# Patient Record
Sex: MALE | Race: WHITE | NOT HISPANIC OR LATINO
[De-identification: names, ages, dates, MRNs, and addresses within clinical notes are randomized per-mention and may not be internally consistent; named-entity substitution may affect disease eponyms.]

---

## 2019-07-03 ENCOUNTER — APPOINTMENT (OUTPATIENT)
Dept: THORACIC SURGERY | Facility: CLINIC | Age: 69
End: 2019-07-03

## 2019-07-03 VITALS
WEIGHT: 204 LBS | HEART RATE: 79 BPM | RESPIRATION RATE: 15 BRPM | TEMPERATURE: 97.8 F | DIASTOLIC BLOOD PRESSURE: 89 MMHG | SYSTOLIC BLOOD PRESSURE: 135 MMHG | OXYGEN SATURATION: 94 %

## 2019-07-03 DIAGNOSIS — F17.200 NICOTINE DEPENDENCE, UNSPECIFIED, UNCOMPLICATED: ICD-10-CM

## 2019-07-03 DIAGNOSIS — Z87.19 PERSONAL HISTORY OF OTHER DISEASES OF THE DIGESTIVE SYSTEM: ICD-10-CM

## 2019-07-03 DIAGNOSIS — R94.2 ABNORMAL RESULTS OF PULMONARY FUNCTION STUDIES: ICD-10-CM

## 2019-07-03 PROBLEM — Z00.00 ENCOUNTER FOR PREVENTIVE HEALTH EXAMINATION: Status: ACTIVE | Noted: 2019-07-03

## 2019-07-03 NOTE — HISTORY OF PRESENT ILLNESS
[FreeTextEntry1] : 68 year old male, current smoker (45 pack years), with no significant medical history. Lung cancer screening  surveillance CT chest revealed left lower lobe nodule. Follow up PET scan revealed left lower lobe spiculated mass with max SUV of 4.7. He presents today for an initial evaluation. He is referred by Dr. Britton. \par \par Today the patient reports feeling generally well. He denies fever, chills, unintentional weight loss and pain. He does admit to SOB on exertion and an occasional cough.  \par \par PET scan completed on 6/25/19:\par - focal abnormal hypermetabolic activity associated with the spiculated mass medial left lower lobe lung ( SUV 4.7)\par - hypermetabolic subcutaneous focus in the right of midline in the lower back new since 11/2017, possible sebaceous cyst

## 2019-07-03 NOTE — REVIEW OF SYSTEMS
[As Noted in HPI] : as noted in HPI [Cough] : cough [SOB on Exertion] : shortness of breath during exertion [Negative] : Heme/Lymph

## 2019-07-03 NOTE — PHYSICAL EXAM
[General Appearance - Alert] : alert [General Appearance - In No Acute Distress] : in no acute distress [General Appearance - Well Nourished] : well nourished [PERRL With Normal Accommodation] : pupils were equal in size, round, and reactive to light [Sclera] : the sclera and conjunctiva were normal [Outer Ear] : the ears and nose were normal in appearance [Both Tympanic Membranes Were Examined] : both tympanic membranes were normal [Neck Appearance] : the appearance of the neck was normal [Neck Cervical Mass (___cm)] : no neck mass was observed [Respiration, Rhythm And Depth] : normal respiratory rhythm and effort [Exaggerated Use Of Accessory Muscles For Inspiration] : no accessory muscle use [Apical Impulse] : the apical impulse was normal [Heart Rate And Rhythm] : heart rate was normal and rhythm regular [Heart Sounds] : normal S1 and S2 [Examination Of The Chest] : the chest was normal in appearance [2+] : left 2+ [Bowel Sounds] : normal bowel sounds [Abnormal Walk] : normal gait [Abdomen Soft] : soft [Musculoskeletal - Swelling] : no joint swelling seen [Nail Clubbing] : no clubbing  or cyanosis of the fingernails [Skin Color & Pigmentation] : normal skin color and pigmentation [Skin Turgor] : normal skin turgor [] : no rash [Oriented To Time, Place, And Person] : oriented to person, place, and time [Affect] : the affect was normal [Impaired Insight] : insight and judgment were intact

## 2019-07-03 NOTE — ASSESSMENT
[FreeTextEntry1] : 68 year old male, current smoker (45 pack years), with no significant medical history. Lung cancer screening  surveillance CT chest revealed left lower lobe nodule. Follow up PET scan revealed left lower lobe spiculated mass with max SUV of 4.7. He presents today for an initial evaluation. \par \par Today the patient reports feeling generally well. He denies fever, chills, unintentional weight loss and pain. He does admit to SOB on exertion and an occasional cough. PET scan completed on 6/25/19 was reviewed which revealed, a  focal abnormal hypermetabolic activity associated with the spiculated mass medial left lower lobe lung ( SUV 4.7) . I discussed that due to multiple risk factors including, smoking history, and a PET positive spiculated lung nodule, this is high risk for a lung Cancer. It looks like a stage I lung cancer according to the scans. The recommended treatment of choice for a stage I lung cancer is surgical resection. He will be scheduled for a Bronchoscopy, LEFT VATS, robotic assisted, LEFT lower lobectomy, intraoperative frozen section, with MLND on 7/23/19. He will need complete PFTs, cardiac clearance, and medical clearance. \par \par The patient was counseled on the risks, benefits and alternatives of proceeding with lung resection. Specifically, the risk of bleeding, need for a blood transfusion, DVT, pulmonary embolism, pneumonia, postoperative respiratory insufficiency, postoperative ventilator support, as well as prolonged air leak, need for chest drainage, dysrhythmia, myocardial infarction, postoperative pain syndrome, need for adjuvant therapy, risk of recurrence, need for surveillance, conversion to an open procedure, as well as mortality was discussed with the patient who understands and agrees to the above. \par \par PLAN:\par 1. Bronchoscopy, LEFT VATS, robotic assisted, LEFT lower lobectomy, intraoperative frozen section, with MLND on 7/23/19.\par 2. Medical Clearance and Cardiac Clearance (labs, UA, EKG)\par 3. PFTs\par \par

## 2019-07-19 RX ORDER — ATORVASTATIN CALCIUM 20 MG/1
20 TABLET, FILM COATED ORAL
Refills: 0 | Status: ACTIVE | COMMUNITY

## 2019-07-22 VITALS
WEIGHT: 214.29 LBS | TEMPERATURE: 97 F | DIASTOLIC BLOOD PRESSURE: 92 MMHG | SYSTOLIC BLOOD PRESSURE: 125 MMHG | HEIGHT: 70 IN | RESPIRATION RATE: 18 BRPM | HEART RATE: 75 BPM

## 2019-07-22 NOTE — PRE-OP CHECKLIST - SELECT TESTS ORDERED
Urinalysis/EKG/CT chest, Spirometry, cardiac clearance, nuclear stress test, PET CT skull-thigh/CBC/INR/PT/PTT/BMP

## 2019-07-23 ENCOUNTER — RESULT REVIEW (OUTPATIENT)
Age: 69
End: 2019-07-23

## 2019-07-23 ENCOUNTER — APPOINTMENT (OUTPATIENT)
Dept: THORACIC SURGERY | Facility: HOSPITAL | Age: 69
End: 2019-07-23

## 2019-07-23 ENCOUNTER — INPATIENT (INPATIENT)
Facility: HOSPITAL | Age: 69
LOS: 1 days | Discharge: HOME | DRG: 164 | End: 2019-07-25
Attending: THORACIC SURGERY (CARDIOTHORACIC VASCULAR SURGERY) | Admitting: THORACIC SURGERY (CARDIOTHORACIC VASCULAR SURGERY)
Payer: COMMERCIAL

## 2019-07-23 DIAGNOSIS — Z98.890 OTHER SPECIFIED POSTPROCEDURAL STATES: Chronic | ICD-10-CM

## 2019-07-23 DIAGNOSIS — Z41.9 ENCOUNTER FOR PROCEDURE FOR PURPOSES OTHER THAN REMEDYING HEALTH STATE, UNSPECIFIED: Chronic | ICD-10-CM

## 2019-07-23 LAB
ANION GAP SERPL CALC-SCNC: 14 MMOL/L — SIGNIFICANT CHANGE UP (ref 5–17)
APTT BLD: 25.4 SEC — LOW (ref 27.5–36.3)
BASE EXCESS BLDA CALC-SCNC: -2.6 MMOL/L — LOW (ref -2–3)
BASOPHILS # BLD AUTO: 0.09 K/UL — SIGNIFICANT CHANGE UP (ref 0–0.2)
BASOPHILS NFR BLD AUTO: 0.5 % — SIGNIFICANT CHANGE UP (ref 0–2)
BLD GP AB SCN SERPL QL: NEGATIVE — SIGNIFICANT CHANGE UP
BUN SERPL-MCNC: 16 MG/DL — SIGNIFICANT CHANGE UP (ref 7–23)
CALCIUM SERPL-MCNC: 9.6 MG/DL — SIGNIFICANT CHANGE UP (ref 8.4–10.5)
CHLORIDE SERPL-SCNC: 103 MMOL/L — SIGNIFICANT CHANGE UP (ref 96–108)
CO2 SERPL-SCNC: 24 MMOL/L — SIGNIFICANT CHANGE UP (ref 22–31)
CREAT SERPL-MCNC: 0.94 MG/DL — SIGNIFICANT CHANGE UP (ref 0.5–1.3)
EOSINOPHIL # BLD AUTO: 0.05 K/UL — SIGNIFICANT CHANGE UP (ref 0–0.5)
EOSINOPHIL NFR BLD AUTO: 0.3 % — SIGNIFICANT CHANGE UP (ref 0–6)
GLUCOSE SERPL-MCNC: 203 MG/DL — HIGH (ref 70–99)
HCO3 BLDA-SCNC: 24 MMOL/L — SIGNIFICANT CHANGE UP (ref 21–28)
HCT VFR BLD CALC: 49.4 % — SIGNIFICANT CHANGE UP (ref 39–50)
HGB BLD-MCNC: 15.4 G/DL — SIGNIFICANT CHANGE UP (ref 13–17)
IMM GRANULOCYTES NFR BLD AUTO: 1.6 % — HIGH (ref 0–1.5)
INR BLD: 1.08 — SIGNIFICANT CHANGE UP (ref 0.88–1.16)
LYMPHOCYTES # BLD AUTO: 1.37 K/UL — SIGNIFICANT CHANGE UP (ref 1–3.3)
LYMPHOCYTES # BLD AUTO: 7.5 % — LOW (ref 13–44)
MAGNESIUM SERPL-MCNC: 1.8 MG/DL — SIGNIFICANT CHANGE UP (ref 1.6–2.6)
MCHC RBC-ENTMCNC: 27.4 PG — SIGNIFICANT CHANGE UP (ref 27–34)
MCHC RBC-ENTMCNC: 31.2 GM/DL — LOW (ref 32–36)
MCV RBC AUTO: 87.7 FL — SIGNIFICANT CHANGE UP (ref 80–100)
MONOCYTES # BLD AUTO: 0.54 K/UL — SIGNIFICANT CHANGE UP (ref 0–0.9)
MONOCYTES NFR BLD AUTO: 3 % — SIGNIFICANT CHANGE UP (ref 2–14)
NEUTROPHILS # BLD AUTO: 15.88 K/UL — HIGH (ref 1.8–7.4)
NEUTROPHILS NFR BLD AUTO: 87.1 % — HIGH (ref 43–77)
NRBC # BLD: 0 /100 WBCS — SIGNIFICANT CHANGE UP (ref 0–0)
PCO2 BLDA: 47 MMHG — SIGNIFICANT CHANGE UP (ref 35–48)
PH BLDA: 7.32 — LOW (ref 7.35–7.45)
PLATELET # BLD AUTO: 228 K/UL — SIGNIFICANT CHANGE UP (ref 150–400)
PO2 BLDA: 73 MMHG — LOW (ref 83–108)
POTASSIUM SERPL-MCNC: 4.7 MMOL/L — SIGNIFICANT CHANGE UP (ref 3.5–5.3)
POTASSIUM SERPL-SCNC: 4.7 MMOL/L — SIGNIFICANT CHANGE UP (ref 3.5–5.3)
PROTHROM AB SERPL-ACNC: 12.2 SEC — SIGNIFICANT CHANGE UP (ref 10–12.9)
RBC # BLD: 5.63 M/UL — SIGNIFICANT CHANGE UP (ref 4.2–5.8)
RBC # FLD: 15.3 % — HIGH (ref 10.3–14.5)
RH IG SCN BLD-IMP: POSITIVE — SIGNIFICANT CHANGE UP
SAO2 % BLDA: 93 % — LOW (ref 95–100)
SODIUM SERPL-SCNC: 141 MMOL/L — SIGNIFICANT CHANGE UP (ref 135–145)
WBC # BLD: 18.23 K/UL — HIGH (ref 3.8–10.5)
WBC # FLD AUTO: 18.23 K/UL — HIGH (ref 3.8–10.5)

## 2019-07-23 PROCEDURE — 32663 THORACOSCOPY W/LOBECTOMY: CPT

## 2019-07-23 PROCEDURE — 31645 BRNCHSC W/THER ASPIR 1ST: CPT

## 2019-07-23 PROCEDURE — 71045 X-RAY EXAM CHEST 1 VIEW: CPT | Mod: 26

## 2019-07-23 PROCEDURE — S2900 ROBOTIC SURGICAL SYSTEM: CPT | Mod: NC

## 2019-07-23 PROCEDURE — 32668 THORACOSCOPY W/W RESECT DIAG: CPT

## 2019-07-23 PROCEDURE — 32674 THORACOSCOPY LYMPH NODE EXC: CPT

## 2019-07-23 RX ORDER — ACETAMINOPHEN 500 MG
1000 TABLET ORAL ONCE
Refills: 0 | Status: COMPLETED | OUTPATIENT
Start: 2019-07-23 | End: 2019-07-23

## 2019-07-23 RX ORDER — CEFAZOLIN SODIUM 1 G
2000 VIAL (EA) INJECTION EVERY 8 HOURS
Refills: 0 | Status: DISCONTINUED | OUTPATIENT
Start: 2019-07-23 | End: 2019-07-25

## 2019-07-23 RX ORDER — DOCUSATE SODIUM 100 MG
100 CAPSULE ORAL THREE TIMES A DAY
Refills: 0 | Status: DISCONTINUED | OUTPATIENT
Start: 2019-07-23 | End: 2019-07-25

## 2019-07-23 RX ORDER — ATORVASTATIN CALCIUM 80 MG/1
20 TABLET, FILM COATED ORAL AT BEDTIME
Refills: 0 | Status: DISCONTINUED | OUTPATIENT
Start: 2019-07-23 | End: 2019-07-25

## 2019-07-23 RX ORDER — CEFAZOLIN SODIUM 1 G
VIAL (EA) INJECTION
Refills: 0 | Status: DISCONTINUED | OUTPATIENT
Start: 2019-07-23 | End: 2019-07-23

## 2019-07-23 RX ORDER — PANTOPRAZOLE SODIUM 20 MG/1
40 TABLET, DELAYED RELEASE ORAL
Refills: 0 | Status: DISCONTINUED | OUTPATIENT
Start: 2019-07-23 | End: 2019-07-25

## 2019-07-23 RX ORDER — HEPARIN SODIUM 5000 [USP'U]/ML
5000 INJECTION INTRAVENOUS; SUBCUTANEOUS EVERY 8 HOURS
Refills: 0 | Status: DISCONTINUED | OUTPATIENT
Start: 2019-07-23 | End: 2019-07-25

## 2019-07-23 RX ORDER — KETOROLAC TROMETHAMINE 30 MG/ML
15 SYRINGE (ML) INJECTION ONCE
Refills: 0 | Status: DISCONTINUED | OUTPATIENT
Start: 2019-07-23 | End: 2019-07-25

## 2019-07-23 RX ORDER — SODIUM CHLORIDE 9 MG/ML
1000 INJECTION, SOLUTION INTRAVENOUS
Refills: 0 | Status: DISCONTINUED | OUTPATIENT
Start: 2019-07-23 | End: 2019-07-24

## 2019-07-23 RX ORDER — LIDOCAINE 4 G/100G
1 CREAM TOPICAL DAILY
Refills: 0 | Status: DISCONTINUED | OUTPATIENT
Start: 2019-07-23 | End: 2019-07-25

## 2019-07-23 RX ORDER — BUPIVACAINE 13.3 MG/ML
20 INJECTION, SUSPENSION, LIPOSOMAL INFILTRATION ONCE
Refills: 0 | Status: DISCONTINUED | OUTPATIENT
Start: 2019-07-23 | End: 2019-07-23

## 2019-07-23 RX ORDER — NICOTINE POLACRILEX 2 MG
1 GUM BUCCAL DAILY
Refills: 0 | Status: DISCONTINUED | OUTPATIENT
Start: 2019-07-23 | End: 2019-07-25

## 2019-07-23 RX ORDER — TAMSULOSIN HYDROCHLORIDE 0.4 MG/1
0.4 CAPSULE ORAL AT BEDTIME
Refills: 0 | Status: DISCONTINUED | OUTPATIENT
Start: 2019-07-23 | End: 2019-07-25

## 2019-07-23 RX ADMIN — SODIUM CHLORIDE 50 MILLILITER(S): 9 INJECTION, SOLUTION INTRAVENOUS at 17:55

## 2019-07-23 RX ADMIN — Medication 400 MILLIGRAM(S): at 20:06

## 2019-07-23 RX ADMIN — LIDOCAINE 1 PATCH: 4 CREAM TOPICAL at 17:55

## 2019-07-23 RX ADMIN — LIDOCAINE 1 PATCH: 4 CREAM TOPICAL at 19:00

## 2019-07-23 RX ADMIN — ATORVASTATIN CALCIUM 20 MILLIGRAM(S): 80 TABLET, FILM COATED ORAL at 22:46

## 2019-07-23 RX ADMIN — Medication 1000 MILLIGRAM(S): at 21:30

## 2019-07-23 RX ADMIN — Medication 1 PATCH: at 20:13

## 2019-07-23 RX ADMIN — Medication 100 MILLIGRAM(S): at 22:46

## 2019-07-23 RX ADMIN — TAMSULOSIN HYDROCHLORIDE 0.4 MILLIGRAM(S): 0.4 CAPSULE ORAL at 22:46

## 2019-07-23 RX ADMIN — Medication 2000 MILLIGRAM(S): at 22:46

## 2019-07-23 NOTE — H&P ADULT - ASSESSMENT
Neurovascular: No delirium.   -post op pain control per PRN regimen     Cardiovascular: Hemodynamically stable.   -cardiac monitor continue    Respiratory: lung nodule  -surgical intervention for diagnosis and staging  -post op pulmonary toilet and increase ambulation   -education for smoking cessation  -If on oxygen wean to RA from for O2 Sat > 93%.  -CXR daily  -consider oncology consult    ID: afebrile   -complete pre op, intra op, and post op abx course  -continue to monitor vitals and lad daily   -CXR daily  -Observe for SIRS/Sepsis Syndrome.    Prophylaxis:  -DVT prophylaxis with 5000 SubQ Heparin q8h and SCD's  -GI prophylaxis with oral intake and PPI daily     Disposition:  -surgical intervention   -PACU and 9[facility]

## 2019-07-23 NOTE — PACU DISCHARGE NOTE - COMMENTS
pt more alert and oriented x4- calm and cooperative-no longer combative- bilateral restraints removed-pt no loner trying to get OOB and pulling PIV- following commands- moving all extremities- airway patent- no crepitus felt- left lateral chest tube patent and draining 10ml of sanguinous drainage- tolerating ice chips- denies  N/V-Pain management in progress- due to void at 1 am- report given to [facility] nurse-pt left via bed on a monitor and suction to [id number] accompanied by nurse and PCA with IVF and supplemental oxygen

## 2019-07-23 NOTE — H&P ADULT - NSHPPHYSICALEXAM_GEN_ALL_CORE
T(C): --  T(F): --  HR: --  BP: --  BP(mean): --  RR: --  SpO2: --    CONSTITUTIONAL:                                                                         NEURO:                                                                                                                EYES:                                                                                                    ENMT:                                                                                                 CV:                                                                                                      RESPIRATORY:                                                                                   GI:                                                                                                         : MANZO + / -                                                                                   MUSKULOSKELETAL:                                                                        SKIN / BREAST:

## 2019-07-23 NOTE — H&P ADULT - NSHPREVIEWOFSYSTEMS_GEN_ALL_CORE
CONSTITUTIONAL:  Fevers / chills, sweats, fatigue, weight loss, weight gain                                      NEURO:  parathesias, seizures, syncope, confusion                                                                                 EYES:  Blurry vision, discharge, pain, loss of vision                                                                                    ENMT:  Difficulty hearing, vertigo, dysphagia, epistaxis, recent dental work                                       CV:  Chest pain, palpitations, AMIN, orthopnea                                                                                           RESPIRATORY:  Wheezing, SOB, cough / sputum, hemoptysis                                                                GI:  Nausea, vommiting, diarrhea, constipation, melena                                                                          : Hematuria, dysuria, urgency, incontinence                                                                                         MUSKULOSKELETAL:  arthritis, joint swelling, muscle weakness                                                             SKIN/BREAST:  rash, itching, maxine loss, masses                                                                                              PSYCH:  depresion, anxiety, suicidal ideation                                                                                               HEME/LYMPH:  bruises easily, enlarged lymph nodes, tender lymph nodes                                          ENDOCRINE:  cold intolerance, heat intolerance, polydipsia

## 2019-07-23 NOTE — PROGRESS NOTE ADULT - SUBJECTIVE AND OBJECTIVE BOX
Post operative / PACU note     Operation / Date: 7/23/19 L VATS LLLx     SUBJECTIVE ASSESSMENT:  Patient seen this afternoon at bedside, combative post waking up from anesthesia and groaning. Denies any complaints currently.     Vital Signs Last 24 Hrs  T(C): 36.4 (23 Jul 2019 17:06), Max: 36.4 (23 Jul 2019 17:06)  T(F): 97.6 (23 Jul 2019 17:06), Max: 97.6 (23 Jul 2019 17:06)  HR: 88 (23 Jul 2019 17:21) (88 - 96)  BP: 131/65 (23 Jul 2019 17:21) (131/65 - 153/81)  BP(mean): 94 (23 Jul 2019 17:21) (94 - 108)  RR: 26 (23 Jul 2019 17:21) (14 - 26)  SpO2: 95% (23 Jul 2019 17:21) (94% - 95%)  I&O's Detail      CHEST TUBE:  Yes on suction, + 1/5 intermittent air leak   ESTEFANIA DRAIN: No  EPICARDIAL WIRES: No  TIE DOWNS: Yes   MANZO: Yes    PHYSICAL EXAM:    General: Patient lying comfortably in bed, no acute distress     Neurological: Agitated and restrained, groaning and uncooperative with exam. Following simple commands. Hand strength 5/5 bilaterally. Moves all extremities to command     Cardiovascular: S1S2, RRR, no murmurs appreciated on exam     Respiratory: Uncooperative with exam     Gastrointestinal: Abdomen soft, non tender, non distended     Extremities: Warm and well perfused. No edema or calf tenderness     Vascular: Peripheral pulses 2+ bilaterally     Incision Sites: L VATS incisions C/D/I   Chest tube secured in place     LABS:      COUMADIN:  No    MEDICATIONS  (STANDING):  acetaminophen  IVPB .. 1000 milliGRAM(s) IV Intermittent once  atorvastatin 20 milliGRAM(s) Oral at bedtime  ceFAZolin   IVPB      docusate sodium 100 milliGRAM(s) Oral three times a day  heparin  Injectable 5000 Unit(s) SubCutaneous every 8 hours  lactated ringers. 1000 milliLiter(s) (50 mL/Hr) IV Continuous <Continuous>  lidocaine   Patch 1 Patch Transdermal daily  nicotine - 21 mG/24Hr(s) Patch 1 patch Transdermal daily  pantoprazole    Tablet 40 milliGRAM(s) Oral before breakfast  tamsulosin 0.4 milliGRAM(s) Oral at bedtime    MEDICATIONS  (PRN):      RADIOLOGY & ADDITIONAL TESTS:

## 2019-07-23 NOTE — H&P ADULT - HISTORY OF PRESENT ILLNESS
68 year old male, current smoker (45 pack years), with no significant medical history. Lung cancer screening surveillance CT chest revealed left lower lobe nodule. Follow up PET scan revealed left lower lobe spiculated mass with max SUV of 4.7. He presents today for an initial evaluation. He is referred by Dr. Britton.     Today the patient reports feeling generally well. He denies fever, chills, unintentional weight loss and pain. He does admit to SOB on exertion and an occasional cough.     PET scan completed on 6/25/19:  - focal abnormal hypermetabolic activity associated with the spiculated mass medial left lower lobe lung ( SUV 4.7)  - hypermetabolic subcutaneous focus in the right of midline in the lower back new since 11/2017, possible sebaceous cyst      Upon arrival, patient is

## 2019-07-23 NOTE — BRIEF OPERATIVE NOTE - NSICDXBRIEFPROCEDURE_GEN_ALL_CORE_FT
PROCEDURES:  VATS, with lobectomy 23-Jul-2019 17:00:49 Left VATS, robotic assisted, left lower lobe wedge, left lower lobectomy, mediastinal lymph node dissection Amyu, Yenni

## 2019-07-23 NOTE — H&P ADULT - NSHPLABSRESULTS_GEN_ALL_CORE
68 year old male, current smoker (45 pack years), with no significant medical history. Lung cancer screening surveillance CT chest revealed left lower lobe nodule. Follow up PET scan revealed left lower lobe spiculated mass with max SUV of 4.7. He presents today for an initial evaluation. He is referred by Dr. Britton.     Today the patient reports feeling generally well. He denies fever, chills, unintentional weight loss and pain. He does admit to SOB on exertion and an occasional cough.     PET scan completed on 6/25/19:  - focal abnormal hypermetabolic activity associated with the spiculated mass medial left lower lobe lung ( SUV 4.7)  - hypermetabolic subcutaneous focus in the right of midline in the lower back new since 11/2017, possible sebaceous cyst

## 2019-07-24 LAB
ANION GAP SERPL CALC-SCNC: 13 MMOL/L — SIGNIFICANT CHANGE UP (ref 5–17)
ANION GAP SERPL CALC-SCNC: 15 MMOL/L — SIGNIFICANT CHANGE UP (ref 5–17)
BUN SERPL-MCNC: 19 MG/DL — SIGNIFICANT CHANGE UP (ref 7–23)
BUN SERPL-MCNC: 20 MG/DL — SIGNIFICANT CHANGE UP (ref 7–23)
CALCIUM SERPL-MCNC: 9.3 MG/DL — SIGNIFICANT CHANGE UP (ref 8.4–10.5)
CALCIUM SERPL-MCNC: 9.8 MG/DL — SIGNIFICANT CHANGE UP (ref 8.4–10.5)
CHLORIDE SERPL-SCNC: 100 MMOL/L — SIGNIFICANT CHANGE UP (ref 96–108)
CHLORIDE SERPL-SCNC: 102 MMOL/L — SIGNIFICANT CHANGE UP (ref 96–108)
CO2 SERPL-SCNC: 21 MMOL/L — LOW (ref 22–31)
CO2 SERPL-SCNC: 23 MMOL/L — SIGNIFICANT CHANGE UP (ref 22–31)
CREAT SERPL-MCNC: 0.75 MG/DL — SIGNIFICANT CHANGE UP (ref 0.5–1.3)
CREAT SERPL-MCNC: 0.83 MG/DL — SIGNIFICANT CHANGE UP (ref 0.5–1.3)
GLUCOSE BLDC GLUCOMTR-MCNC: 190 MG/DL — HIGH (ref 70–99)
GLUCOSE BLDC GLUCOMTR-MCNC: 246 MG/DL — HIGH (ref 70–99)
GLUCOSE BLDC GLUCOMTR-MCNC: 281 MG/DL — HIGH (ref 70–99)
GLUCOSE SERPL-MCNC: 156 MG/DL — HIGH (ref 70–99)
GLUCOSE SERPL-MCNC: 247 MG/DL — HIGH (ref 70–99)
HBA1C BLD-MCNC: 7 % — HIGH (ref 4–5.6)
HCT VFR BLD CALC: 46.3 % — SIGNIFICANT CHANGE UP (ref 39–50)
HCT VFR BLD CALC: 50 % — SIGNIFICANT CHANGE UP (ref 39–50)
HGB BLD-MCNC: 15 G/DL — SIGNIFICANT CHANGE UP (ref 13–17)
HGB BLD-MCNC: 15.8 G/DL — SIGNIFICANT CHANGE UP (ref 13–17)
MAGNESIUM SERPL-MCNC: 1.9 MG/DL — SIGNIFICANT CHANGE UP (ref 1.6–2.6)
MAGNESIUM SERPL-MCNC: 2.1 MG/DL — SIGNIFICANT CHANGE UP (ref 1.6–2.6)
MCHC RBC-ENTMCNC: 27.6 PG — SIGNIFICANT CHANGE UP (ref 27–34)
MCHC RBC-ENTMCNC: 27.7 PG — SIGNIFICANT CHANGE UP (ref 27–34)
MCHC RBC-ENTMCNC: 31.6 GM/DL — LOW (ref 32–36)
MCHC RBC-ENTMCNC: 32.4 GM/DL — SIGNIFICANT CHANGE UP (ref 32–36)
MCV RBC AUTO: 85.4 FL — SIGNIFICANT CHANGE UP (ref 80–100)
MCV RBC AUTO: 87.4 FL — SIGNIFICANT CHANGE UP (ref 80–100)
NRBC # BLD: 0 /100 WBCS — SIGNIFICANT CHANGE UP (ref 0–0)
NRBC # BLD: 0 /100 WBCS — SIGNIFICANT CHANGE UP (ref 0–0)
PLATELET # BLD AUTO: 216 K/UL — SIGNIFICANT CHANGE UP (ref 150–400)
PLATELET # BLD AUTO: 230 K/UL — SIGNIFICANT CHANGE UP (ref 150–400)
POTASSIUM SERPL-MCNC: 4.4 MMOL/L — SIGNIFICANT CHANGE UP (ref 3.5–5.3)
POTASSIUM SERPL-MCNC: 4.7 MMOL/L — SIGNIFICANT CHANGE UP (ref 3.5–5.3)
POTASSIUM SERPL-SCNC: 4.4 MMOL/L — SIGNIFICANT CHANGE UP (ref 3.5–5.3)
POTASSIUM SERPL-SCNC: 4.7 MMOL/L — SIGNIFICANT CHANGE UP (ref 3.5–5.3)
RBC # BLD: 5.42 M/UL — SIGNIFICANT CHANGE UP (ref 4.2–5.8)
RBC # BLD: 5.72 M/UL — SIGNIFICANT CHANGE UP (ref 4.2–5.8)
RBC # FLD: 14.7 % — HIGH (ref 10.3–14.5)
RBC # FLD: 15 % — HIGH (ref 10.3–14.5)
SODIUM SERPL-SCNC: 136 MMOL/L — SIGNIFICANT CHANGE UP (ref 135–145)
SODIUM SERPL-SCNC: 138 MMOL/L — SIGNIFICANT CHANGE UP (ref 135–145)
TSH SERPL-MCNC: 0.35 UIU/ML — SIGNIFICANT CHANGE UP (ref 0.35–4.94)
WBC # BLD: 17.35 K/UL — HIGH (ref 3.8–10.5)
WBC # BLD: 18.89 K/UL — HIGH (ref 3.8–10.5)
WBC # FLD AUTO: 17.35 K/UL — HIGH (ref 3.8–10.5)
WBC # FLD AUTO: 18.89 K/UL — HIGH (ref 3.8–10.5)

## 2019-07-24 PROCEDURE — 71045 X-RAY EXAM CHEST 1 VIEW: CPT | Mod: 26,76

## 2019-07-24 RX ORDER — DEXTROSE 50 % IN WATER 50 %
15 SYRINGE (ML) INTRAVENOUS ONCE
Refills: 0 | Status: DISCONTINUED | OUTPATIENT
Start: 2019-07-24 | End: 2019-07-25

## 2019-07-24 RX ORDER — GLUCAGON INJECTION, SOLUTION 0.5 MG/.1ML
1 INJECTION, SOLUTION SUBCUTANEOUS ONCE
Refills: 0 | Status: DISCONTINUED | OUTPATIENT
Start: 2019-07-24 | End: 2019-07-25

## 2019-07-24 RX ORDER — BUDESONIDE, MICRONIZED 100 %
0.5 POWDER (GRAM) MISCELLANEOUS EVERY 12 HOURS
Refills: 0 | Status: DISCONTINUED | OUTPATIENT
Start: 2019-07-24 | End: 2019-07-25

## 2019-07-24 RX ORDER — INSULIN LISPRO 100/ML
VIAL (ML) SUBCUTANEOUS
Refills: 0 | Status: DISCONTINUED | OUTPATIENT
Start: 2019-07-24 | End: 2019-07-25

## 2019-07-24 RX ORDER — DEXTROSE 50 % IN WATER 50 %
25 SYRINGE (ML) INTRAVENOUS ONCE
Refills: 0 | Status: DISCONTINUED | OUTPATIENT
Start: 2019-07-24 | End: 2019-07-25

## 2019-07-24 RX ORDER — IPRATROPIUM/ALBUTEROL SULFATE 18-103MCG
3 AEROSOL WITH ADAPTER (GRAM) INHALATION EVERY 6 HOURS
Refills: 0 | Status: DISCONTINUED | OUTPATIENT
Start: 2019-07-24 | End: 2019-07-25

## 2019-07-24 RX ORDER — SODIUM CHLORIDE 9 MG/ML
1000 INJECTION, SOLUTION INTRAVENOUS
Refills: 0 | Status: DISCONTINUED | OUTPATIENT
Start: 2019-07-24 | End: 2019-07-25

## 2019-07-24 RX ORDER — MAGNESIUM OXIDE 400 MG ORAL TABLET 241.3 MG
800 TABLET ORAL ONCE
Refills: 0 | Status: COMPLETED | OUTPATIENT
Start: 2019-07-24 | End: 2019-07-24

## 2019-07-24 RX ORDER — DEXTROSE 50 % IN WATER 50 %
12.5 SYRINGE (ML) INTRAVENOUS ONCE
Refills: 0 | Status: DISCONTINUED | OUTPATIENT
Start: 2019-07-24 | End: 2019-07-25

## 2019-07-24 RX ORDER — CALCIUM CARBONATE 500(1250)
1 TABLET ORAL DAILY
Refills: 0 | Status: DISCONTINUED | OUTPATIENT
Start: 2019-07-24 | End: 2019-07-25

## 2019-07-24 RX ADMIN — HEPARIN SODIUM 5000 UNIT(S): 5000 INJECTION INTRAVENOUS; SUBCUTANEOUS at 06:20

## 2019-07-24 RX ADMIN — Medication 100 MILLIGRAM(S): at 06:19

## 2019-07-24 RX ADMIN — LIDOCAINE 1 PATCH: 4 CREAM TOPICAL at 22:22

## 2019-07-24 RX ADMIN — LIDOCAINE 1 PATCH: 4 CREAM TOPICAL at 12:24

## 2019-07-24 RX ADMIN — PANTOPRAZOLE SODIUM 40 MILLIGRAM(S): 20 TABLET, DELAYED RELEASE ORAL at 06:20

## 2019-07-24 RX ADMIN — Medication 1 PATCH: at 19:19

## 2019-07-24 RX ADMIN — Medication 1 PATCH: at 19:32

## 2019-07-24 RX ADMIN — Medication 1 TABLET(S): at 12:24

## 2019-07-24 RX ADMIN — Medication 1 PATCH: at 06:24

## 2019-07-24 RX ADMIN — Medication 3 MILLILITER(S): at 12:23

## 2019-07-24 RX ADMIN — Medication 3 MILLILITER(S): at 17:07

## 2019-07-24 RX ADMIN — MAGNESIUM OXIDE 400 MG ORAL TABLET 800 MILLIGRAM(S): 241.3 TABLET ORAL at 16:06

## 2019-07-24 RX ADMIN — LIDOCAINE 1 PATCH: 4 CREAM TOPICAL at 06:24

## 2019-07-24 RX ADMIN — LIDOCAINE 1 PATCH: 4 CREAM TOPICAL at 19:11

## 2019-07-24 RX ADMIN — Medication 2000 MILLIGRAM(S): at 06:20

## 2019-07-24 RX ADMIN — Medication 0.5 MILLIGRAM(S): at 17:07

## 2019-07-24 NOTE — PROGRESS NOTE ADULT - SUBJECTIVE AND OBJECTIVE BOX
Patient discussed on morning rounds with       Operation / Date:     SUBJECTIVE ASSESSMENT:  68y Male         Vital Signs Last 24 Hrs  T(C): 37.2 (24 Jul 2019 09:07), Max: 37.2 (24 Jul 2019 09:07)  T(F): 99 (24 Jul 2019 09:07), Max: 99 (24 Jul 2019 09:07)  HR: 96 (24 Jul 2019 08:49) (68 - 96)  BP: 119/65 (24 Jul 2019 08:49) (113/64 - 153/81)  BP(mean): 87 (24 Jul 2019 08:49) (81 - 108)  RR: 18 (24 Jul 2019 08:49) (14 - 26)  SpO2: 93% (24 Jul 2019 08:49) (92% - 96%)  I&O's Detail    23 Jul 2019 07:01  -  24 Jul 2019 07:00  --------------------------------------------------------  IN:    lactated ringers.: 200 mL    Solution: 100 mL  Total IN: 300 mL    OUT:    Chest Tube: 100 mL    Voided: 550 mL  Total OUT: 650 mL    Total NET: -350 mL      24 Jul 2019 07:01  -  24 Jul 2019 11:50  --------------------------------------------------------  IN:    Oral Fluid: 180 mL  Total IN: 180 mL    OUT:    Chest Tube: 20 mL    Voided: 225 mL  Total OUT: 245 mL    Total NET: -65 mL          CHEST TUBE:  Yes/No. AIR LEAKS: Yes/No. Suction / H2O SEAL.   ESTEFANIA DRAIN:  Yes/No.  EPICARDIAL WIRES: Yes/No.  TIE DOWNS: Yes/No.  MANZO: Yes/No.    PHYSICAL EXAM:    General:     Neurological:    Cardiovascular:    Respiratory:    Gastrointestinal:    Extremities:    Vascular:    Incision Sites:    LABS:                        15.8   18.89 )-----------( 230      ( 24 Jul 2019 05:54 )             50.0       COUMADIN:  Yes/No. REASON: .    PT/INR - ( 23 Jul 2019 17:52 )   PT: 12.2 sec;   INR: 1.08          PTT - ( 23 Jul 2019 17:52 )  PTT:25.4 sec    07-24    138  |  100  |  20  ----------------------------<  156<H>  4.4   |  23  |  0.83    Ca    9.8      24 Jul 2019 05:54  Mg     1.9     07-24            MEDICATIONS  (STANDING):  atorvastatin 20 milliGRAM(s) Oral at bedtime  calcium carbonate    500 mG (Tums) Chewable 1 Tablet(s) Chew daily  ceFAZolin  Injectable. 2000 milliGRAM(s) IV Push every 8 hours  docusate sodium 100 milliGRAM(s) Oral three times a day  heparin  Injectable 5000 Unit(s) SubCutaneous every 8 hours  lactated ringers. 1000 milliLiter(s) (50 mL/Hr) IV Continuous <Continuous>  lidocaine   Patch 1 Patch Transdermal daily  nicotine - 21 mG/24Hr(s) Patch 1 patch Transdermal daily  pantoprazole    Tablet 40 milliGRAM(s) Oral before breakfast  tamsulosin 0.4 milliGRAM(s) Oral at bedtime    MEDICATIONS  (PRN):  ketorolac   Injectable 15 milliGRAM(s) IV Push once PRN breakthrough pain        RADIOLOGY & ADDITIONAL TESTS: Patient discussed on morning rounds with Dr. Irizarry    Operation / Date: 7/23/19 L VATS, RA, LLx and MLND    SUBJECTIVE ASSESSMENT:  68y Male seen and examined bedside. Patient is not offering any acute complaints and is asking when he can leave the hospital. Denies chest pain, SOB, palpitations, hemopytsis, N/V/D, abdominal pain, dizziness, fever or chills. Patient is ambulating, tolerating PO diet, and voiding spontaneously.         Vital Signs Last 24 Hrs  T(C): 37.2 (24 Jul 2019 09:07), Max: 37.2 (24 Jul 2019 09:07)  T(F): 99 (24 Jul 2019 09:07), Max: 99 (24 Jul 2019 09:07)  HR: 96 (24 Jul 2019 08:49) (68 - 96)  BP: 119/65 (24 Jul 2019 08:49) (113/64 - 153/81)  BP(mean): 87 (24 Jul 2019 08:49) (81 - 108)  RR: 18 (24 Jul 2019 08:49) (14 - 26)  SpO2: 93% (24 Jul 2019 08:49) (92% - 96%)  I&O's Detail    23 Jul 2019 07:01  -  24 Jul 2019 07:00  --------------------------------------------------------  IN:    lactated ringers.: 200 mL    Solution: 100 mL  Total IN: 300 mL    OUT:    Chest Tube: 100 mL    Voided: 550 mL  Total OUT: 650 mL    Total NET: -350 mL      24 Jul 2019 07:01  -  24 Jul 2019 11:50  --------------------------------------------------------  IN:    Oral Fluid: 180 mL  Total IN: 180 mL    OUT:    Chest Tube: 20 mL    Voided: 225 mL  Total OUT: 245 mL    Total NET: -65 mL          CHEST TUBE:  No.   ESTEFANIA DRAIN: No.  EPICARDIAL WIRES: No.  TIE DOWNS: Yes  MANZO: No.    PHYSICAL EXAM:    General: Patient lying comfortably in bed, no acute distress     Neurological: Alert and oriented. No focal neurological deficits     Cardiovascular: S1S2, RRR, no murmurs appreciated on exam     Respiratory: Clear to ausculation bilaterally, no w/r/r    Gastrointestinal: Abdomen soft, non tender, non distended     Extremities: Warm and well perfused. No edema or calf tenderness     Vascular: Peripheral pulses 2+ b/l.    Incision Sites: L VATS: c/d/i, chest tube site with tiedown and covered with clean dry occlusive dressing.     LABS:                        15.8   18.89 )-----------( 230      ( 24 Jul 2019 05:54 )             50.0       COUMADIN:  No.   PT/INR - ( 23 Jul 2019 17:52 )   PT: 12.2 sec;   INR: 1.08          PTT - ( 23 Jul 2019 17:52 )  PTT:25.4 sec    07-24    138  |  100  |  20  ----------------------------<  156<H>  4.4   |  23  |  0.83    Ca    9.8      24 Jul 2019 05:54  Mg     1.9     07-24            MEDICATIONS  (STANDING):  atorvastatin 20 milliGRAM(s) Oral at bedtime  calcium carbonate    500 mG (Tums) Chewable 1 Tablet(s) Chew daily  ceFAZolin  Injectable. 2000 milliGRAM(s) IV Push every 8 hours  docusate sodium 100 milliGRAM(s) Oral three times a day  heparin  Injectable 5000 Unit(s) SubCutaneous every 8 hours  lactated ringers. 1000 milliLiter(s) (50 mL/Hr) IV Continuous <Continuous>  lidocaine   Patch 1 Patch Transdermal daily  nicotine - 21 mG/24Hr(s) Patch 1 patch Transdermal daily  pantoprazole    Tablet 40 milliGRAM(s) Oral before breakfast  tamsulosin 0.4 milliGRAM(s) Oral at bedtime    MEDICATIONS  (PRN):  ketorolac   Injectable 15 milliGRAM(s) IV Push once PRN breakthrough pain        RADIOLOGY & ADDITIONAL TESTS:    < from: Xray Chest 1 View- PORTABLE-Urgent (07.23.19 @ 17:22) >    EXAM:  XR CHEST PORTABLE URGENT 1V                          PROCEDURE DATE:  07/23/2019          INTERPRETATION:  Clinical History / Reason for exam: Post operative   evaluation    Comparison : Chest radiograph None.    Technique/Positioning: Single frontal view of the chest    Findings:    Support devices: Left-sided chest tube is noted.    Cardiac/mediastinum/hilum: Unremarkable.    Lung parenchyma/Pleura: There is a retrocardiac opacity.    Skeleton/soft tissues: Unremarkable.    Impression:     Retrocardiac opacity.    < end of copied text >

## 2019-07-24 NOTE — CONSULT NOTE ADULT - SUBJECTIVE AND OBJECTIVE BOX
HPI:  68 year old male, current smoker (45 pack years), with no significant medical history. Lung cancer screening surveillance CT chest revealed left lower lobe nodule. Follow up PET scan revealed left lower lobe spiculated mass with max SUV of 4.7. He presents today for an initial evaluation. He is referred by Dr. Britton.     Today the patient reports feeling generally well. He denies fever, chills, unintentional weight loss and pain. He does admit to SOB on exertion and an occasional cough.     PET scan completed on 6/25/19:  - focal abnormal hypermetabolic activity associated with the spiculated mass medial left lower lobe lung ( SUV 4.7)  - hypermetabolic subcutaneous focus in the right of midline in the lower back new since 11/2017, possible sebaceous cyst     Pt now s/p VATS with left lobectomy and chest tube placement.   Pt was given decadron 4mg yesterday afternoon.    he is now eating well, is on moderate dose insulin sliding sacle at this time.   Pt was previously told he had pre-DM, no home anti-hyperglycemic medications, does not check glucsoe at home.  He reports a moderate carbohydrate intake, avoids, pasta, juice, soda, but does endorse eating bread, rice, and high sugar fruits.    He denies worsening blurry vision, he endorses some numbness in his feet, denies nausea, frequent urination, increased thirst, weight loss or gain, changes in appetite.     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     Current Meds:  ALBUTerol/ipratropium for Nebulization 3 milliLiter(s) Nebulizer every 6 hours  atorvastatin 20 milliGRAM(s) Oral at bedtime  buDESOnide    Inhalation Suspension 0.5 milliGRAM(s) Inhalation every 12 hours  calcium carbonate    500 mG (Tums) Chewable 1 Tablet(s) Chew daily  ceFAZolin  Injectable. 2000 milliGRAM(s) IV Push every 8 hours  docusate sodium 100 milliGRAM(s) Oral three times a day  heparin  Injectable 5000 Unit(s) SubCutaneous every 8 hours  ketorolac   Injectable 15 milliGRAM(s) IV Push once PRN  lidocaine   Patch 1 Patch Transdermal daily  nicotine - 21 mG/24Hr(s) Patch 1 patch Transdermal daily  pantoprazole    Tablet 40 milliGRAM(s) Oral before breakfast  tamsulosin 0.4 milliGRAM(s) Oral at bedtime      Allergies:  No Known Allergies      ROS:  Denies the following except as indicated.    General: weight loss/weight gain, decreased appetite, fatigue  Eyes: Blurry vision, double vision, visual changes  ENT: Throat pain, changes in voice,   CV: palpitations, SOB, CP, cough  GI: NVD, difficulty swallowing, abdominal pain  : polyuria, dysuria  Endo: abnormal menses, temperature intolerance, decreased libido  MSK: weakness, joint pain  Skin: rash, dryness, diaphoresis  Heme: Easy bruising,bleeding  Neuro: HA, dizziness, lightheadedness, numbness tingling  Psych: Anxiety, Depression    Vital Signs Last 24 Hrs  T(C): 37.2 (24 Jul 2019 13:03), Max: 37.2 (24 Jul 2019 09:07)  T(F): 98.9 (24 Jul 2019 13:03), Max: 99 (24 Jul 2019 09:07)  HR: 80 (24 Jul 2019 12:39) (68 - 96)  BP: 140/68 (24 Jul 2019 12:39) (113/64 - 153/81)  BP(mean): 95 (24 Jul 2019 12:39) (81 - 108)  RR: 16 (24 Jul 2019 12:39) (14 - 26)  SpO2: 95% (24 Jul 2019 12:39) (92% - 96%)  Height (cm): 177.8 (07-23 @ 09:32)  Weight (kg): 97.2 (07-23 @ 09:32)  BMI (kg/m2): 30.7 (07-23 @ 09:32)    Constitutional: wn/wd in NAD.   HEENT: NCAT, MMM, OP clear, EOMI, , no proptosis or lid retraction  Neck: no thyromegaly or palpable thyroid nodules   Respiratory: lungs CTAB.  Cardiovascular: regular rhythm, normal S1 and S2, no audible murmurs  GI: soft, NT/ND, no masses/HSM appreciated.  Neurology: no tremors, DTR 2+  Skin: no visible rashes/lesions  Psychiatric: AAO x 3, normal affect/mood.  Ext: radial pulses intact, DP pulses intact, extremities warm, no cyanosis, clubbing or edema.       LABS:                        15.8   18.89 )-----------( 230      ( 24 Jul 2019 05:54 )             50.0     07-24    138  |  100  |  20  ----------------------------<  156<H>  4.4   |  23  |  0.83    Ca    9.8      24 Jul 2019 05:54  Mg     1.9     07-24      PT/INR - ( 23 Jul 2019 17:52 )   PT: 12.2 sec;   INR: 1.08          PTT - ( 23 Jul 2019 17:52 )  PTT:25.4 sec    Hemoglobin A1C, Whole Blood: 7.0 (07-24 @ 05:54)    Thyroid Stimulating Hormone, Serum: 0.350 (07-24 @ 05:54)        CAPILLARY BLOOD GLUCOSE      POCT Blood Glucose.: 281 mg/dL (24 Jul 2019 11:45)  POCT Blood Glucose.: 246 mg/dL (24 Jul 2019 06:54)

## 2019-07-24 NOTE — CHART NOTE - NSCHARTNOTEFT_GEN_A_CORE
As per Dr. Irizarry, chest tube removed at bedside without incident. U stitch tied down in place and occlusive dressing applied. Patient tolerated procedure well. Follow up CXR without obvious ptx.

## 2019-07-24 NOTE — PROGRESS NOTE ADULT - SUBJECTIVE AND OBJECTIVE BOX
PUD ATTENDING      68 year old male, current smoker (45 pack years), with no significant medical history. Lung cancer screening surveillance CT chest revealed left lower lobe nodule. Follow up PET scan revealed left lower lobe spiculated mass with max SUV of 4.7. He presents today for an initial evaluation. He is referred by Dr. Britton.     Today the patient reports feeling generally well. He denies fever, chills, unintentional weight loss and pain. He does admit to SOB on exertion and an occasional cough.     PET scan completed on 6/25/19:  - focal abnormal hypermetabolic activity associated with the spiculated mass medial left lower lobe lung ( SUV 4.7)  - hypermetabolic subcutaneous focus in the right of midline in the lower back new since 11/2017, possible sebaceous cyst      Upon arrival, patient is      Review of Systems:  Review of Systems: CONSTITUTIONAL:  Fevers / chills, sweats, fatigue, weight loss, weight gain                                      NEURO:  parathesias, seizures, syncope, confusion                                                                                 EYES:  Blurry vision, discharge, pain, loss of vision                                                                                    ENMT:  Difficulty hearing, vertigo, dysphagia, epistaxis, recent dental work                                       CV:  Chest pain, palpitations, AMIN, orthopnea                                                                                           RESPIRATORY:  no wheezing on bronchodilators, SOB, cough / sputum, hemoptysis                                                                GI:  Nausea, vomiting, diarrhea, constipation, melena                                                                          : Hematuria, dysuria, urgency, incontinence                                                                                         MUSKULOSKELETAL:  arthritis, joint swelling, muscle weakness                                                             SKIN/BREAST:  rash, itching, maxine loss, masses                                                                                              PSYCH:  depresion, anxiety, suicidal ideation                                                                                               HEME/LYMPH:  bruises easily, enlarged lymph nodes, tender lymph nodes                                         ENDOCRINE:  cold intolerance, heat intolerance, polydipsia	      Allergies and Intolerances:        Allergies:  	No Known Allergies:     Home Medications:   * Incomplete Medication History as of 22-Jul-2019 14:21 documented in Structured Notes  · 	Lipitor 20 mg oral tablet: Last Dose Taken:  , 1 tab(s) orally once a day  · 	Flomax 0.4 mg oral capsule: Last Dose Taken:  , 1 cap(s) orally once a day    Patient History:    Social History:  Social History (marital status, living situation, occupation, tobacco use, alcohol and drug use, and sexual history): Smoker:  yes daily  (45PY)  ETOH use: no  Ilicit Drug use: no  Occupation:  Assisted device use (Cane / Walker): Live with	     Tobacco Screening:  · Core Measure Site	No	  · Has the patient used tobacco in the past 30 days?	Yes	  · Tobacco Cessation Education/Counseling	Offered and provided	    Risk Assessment:    Present on Admission:  Deep Venous Thrombosis	no	  Pulmonary Embolus	no	  Urinary Catheter	no	  Central Venous Catheter/PICC Line	no	  Surgical Site Incision	no	  Pressure Ulcer(s)	no	       Physical Exam:  Physical Exam: T(C): --  T(F): --  HR: --  BP: --  BP(mean): --  RR: --  SpO2: --   CONSTITUTIONAL:                                                                         NEURO:                                                                                                                EYES:                                                                                                    ENMT:                                                                                                 CV:                                                                                                      RESPIRATORY:                                                                                   GI:                                                                                                         : MANZO + / -                                                                                   MUSKULOSKELETAL:                                                                       SKIN / BREAST:	       Labs and Results:  Labs, Radiology, Cardiology, and Other Results: 68 year old male, current smoker (45 pack years), with no significant medical history. Lung cancer screening surveillance CT chest revealed left lower lobe nodule. Follow up PET scan revealed left lower lobe spiculated mass with max SUV of 4.7. He presents today for an initial evaluation. He was referred by myself.    Today the patient reports feeling generally well. He denies fever, chills, unintentional weight loss and pain. He does admit to SOB on exertion and an occasional cough.    PET scan completed on 6/25/19:  - focal abnormal hypermetabolic activity associated with the spiculated mass medial left lower lobe lung ( SUV 4.7) - hypermetabolic subcutaneous focus in the right of midline in the lower back new since 11/2017, possible sebaceous cyst	    Assessment and Plan:     		  Neurovascular: No delirium.   -post op pain control per PRN regimen with lidocaine patch    Cardiovascular: Hemodynamically stable.   -cardiac monitor continue    Respiratory: lung nodule  -surgical intervention for diagnosis and staging  -post op pulmonary toilet and increase ambulation   -education for smoking cessation  -If on oxygen wean to RA from for O2 Sat > 93%.  -CXR daily  -A/I nebulizers and inhaled steroid for COPD to avoid postop complications    ID: afebrile   -complete pre op, intra op, and post op abx course  -continue to monitor vitals and lad daily   -CXR without no infiltrates  -Observe for SIRS/Sepsis Syndrome.    Prophylaxis:  -DVT prophylaxis with 5000 SubQ Heparin q8h and SCD's  -GI prophylaxis with oral intake and PPI daily     Disposition:   -  continue post op LAMA/LABA  -   mobilization  -  will f/u histology

## 2019-07-24 NOTE — CONSULT NOTE ADULT - ATTENDING COMMENTS
A/P: 68y Male with hx of DM presenting for management of lung nodule, found to have well controlled DM now exacerbated by steroid administration yesterday    1.  DM - type 2, controlled, uncomplicated.   - now exacerbated by steroids, which should resolve in the next 24-48 hours.   - can continue lispro moderate dose scale with meals and at bedtime.   Continue consistent carb diet, Nutrition consult  dilute all medications in NS instead of D5 when possible.     2.  Hyperlipidemia - LDL goal < 70  please check lipid profile, may need increased dose statin    3.  Obesity - outpatient medical management.   would consider GLP-1 agonist for this patient.     Pt is advised to follow up with me at discharge.  Please call  to schedule an appointment.   He may benefit from metformin 500mg twice daily on discharge.

## 2019-07-25 ENCOUNTER — TRANSCRIPTION ENCOUNTER (OUTPATIENT)
Age: 69
End: 2019-07-25

## 2019-07-25 VITALS — TEMPERATURE: 98 F

## 2019-07-25 PROBLEM — R73.09 OTHER ABNORMAL GLUCOSE: Chronic | Status: ACTIVE | Noted: 2019-07-22

## 2019-07-25 PROBLEM — N40.0 BENIGN PROSTATIC HYPERPLASIA WITHOUT LOWER URINARY TRACT SYMPTOMS: Chronic | Status: ACTIVE | Noted: 2019-07-22

## 2019-07-25 PROBLEM — I10 ESSENTIAL (PRIMARY) HYPERTENSION: Chronic | Status: ACTIVE | Noted: 2019-07-22

## 2019-07-25 PROBLEM — N28.1 CYST OF KIDNEY, ACQUIRED: Chronic | Status: ACTIVE | Noted: 2019-07-22

## 2019-07-25 PROBLEM — C34.90 MALIGNANT NEOPLASM OF UNSPECIFIED PART OF UNSPECIFIED BRONCHUS OR LUNG: Chronic | Status: ACTIVE | Noted: 2019-07-22

## 2019-07-25 PROBLEM — E78.5 HYPERLIPIDEMIA, UNSPECIFIED: Chronic | Status: ACTIVE | Noted: 2019-07-22

## 2019-07-25 PROBLEM — J44.9 CHRONIC OBSTRUCTIVE PULMONARY DISEASE, UNSPECIFIED: Chronic | Status: ACTIVE | Noted: 2019-07-22

## 2019-07-25 LAB
ANION GAP SERPL CALC-SCNC: 10 MMOL/L — SIGNIFICANT CHANGE UP (ref 5–17)
BUN SERPL-MCNC: 16 MG/DL — SIGNIFICANT CHANGE UP (ref 7–23)
CALCIUM SERPL-MCNC: 9.7 MG/DL — SIGNIFICANT CHANGE UP (ref 8.4–10.5)
CHLORIDE SERPL-SCNC: 104 MMOL/L — SIGNIFICANT CHANGE UP (ref 96–108)
CO2 SERPL-SCNC: 24 MMOL/L — SIGNIFICANT CHANGE UP (ref 22–31)
CREAT SERPL-MCNC: 0.78 MG/DL — SIGNIFICANT CHANGE UP (ref 0.5–1.3)
GLUCOSE SERPL-MCNC: 165 MG/DL — HIGH (ref 70–99)
HCT VFR BLD CALC: 48.3 % — SIGNIFICANT CHANGE UP (ref 39–50)
HGB BLD-MCNC: 15.3 G/DL — SIGNIFICANT CHANGE UP (ref 13–17)
MAGNESIUM SERPL-MCNC: 1.9 MG/DL — SIGNIFICANT CHANGE UP (ref 1.6–2.6)
MCHC RBC-ENTMCNC: 27.7 PG — SIGNIFICANT CHANGE UP (ref 27–34)
MCHC RBC-ENTMCNC: 31.7 GM/DL — LOW (ref 32–36)
MCV RBC AUTO: 87.5 FL — SIGNIFICANT CHANGE UP (ref 80–100)
NRBC # BLD: 0 /100 WBCS — SIGNIFICANT CHANGE UP (ref 0–0)
PLATELET # BLD AUTO: 230 K/UL — SIGNIFICANT CHANGE UP (ref 150–400)
POTASSIUM SERPL-MCNC: 4.3 MMOL/L — SIGNIFICANT CHANGE UP (ref 3.5–5.3)
POTASSIUM SERPL-SCNC: 4.3 MMOL/L — SIGNIFICANT CHANGE UP (ref 3.5–5.3)
RBC # BLD: 5.52 M/UL — SIGNIFICANT CHANGE UP (ref 4.2–5.8)
RBC # FLD: 15.1 % — HIGH (ref 10.3–14.5)
SODIUM SERPL-SCNC: 138 MMOL/L — SIGNIFICANT CHANGE UP (ref 135–145)
WBC # BLD: 15.03 K/UL — HIGH (ref 3.8–10.5)
WBC # FLD AUTO: 15.03 K/UL — HIGH (ref 3.8–10.5)

## 2019-07-25 PROCEDURE — 86850 RBC ANTIBODY SCREEN: CPT

## 2019-07-25 PROCEDURE — C1889: CPT

## 2019-07-25 PROCEDURE — 83735 ASSAY OF MAGNESIUM: CPT

## 2019-07-25 PROCEDURE — 86900 BLOOD TYPING SEROLOGIC ABO: CPT

## 2019-07-25 PROCEDURE — 82803 BLOOD GASES ANY COMBINATION: CPT

## 2019-07-25 PROCEDURE — 85610 PROTHROMBIN TIME: CPT

## 2019-07-25 PROCEDURE — 88309 TISSUE EXAM BY PATHOLOGIST: CPT

## 2019-07-25 PROCEDURE — 94640 AIRWAY INHALATION TREATMENT: CPT

## 2019-07-25 PROCEDURE — 82962 GLUCOSE BLOOD TEST: CPT

## 2019-07-25 PROCEDURE — 71045 X-RAY EXAM CHEST 1 VIEW: CPT

## 2019-07-25 PROCEDURE — 88341 IMHCHEM/IMCYTCHM EA ADD ANTB: CPT

## 2019-07-25 PROCEDURE — 88332 PATH CONSLTJ SURG EA ADD BLK: CPT

## 2019-07-25 PROCEDURE — 88333 PATH CONSLTJ SURG CYTO XM 1: CPT

## 2019-07-25 PROCEDURE — 85027 COMPLETE CBC AUTOMATED: CPT

## 2019-07-25 PROCEDURE — 84443 ASSAY THYROID STIM HORMONE: CPT

## 2019-07-25 PROCEDURE — 80048 BASIC METABOLIC PNL TOTAL CA: CPT

## 2019-07-25 PROCEDURE — 85730 THROMBOPLASTIN TIME PARTIAL: CPT

## 2019-07-25 PROCEDURE — 88331 PATH CONSLTJ SURG 1 BLK 1SPC: CPT

## 2019-07-25 PROCEDURE — 71045 X-RAY EXAM CHEST 1 VIEW: CPT | Mod: 26

## 2019-07-25 PROCEDURE — 86901 BLOOD TYPING SEROLOGIC RH(D): CPT

## 2019-07-25 PROCEDURE — 88305 TISSUE EXAM BY PATHOLOGIST: CPT

## 2019-07-25 PROCEDURE — S2900: CPT

## 2019-07-25 PROCEDURE — 85025 COMPLETE CBC W/AUTO DIFF WBC: CPT

## 2019-07-25 PROCEDURE — 83036 HEMOGLOBIN GLYCOSYLATED A1C: CPT

## 2019-07-25 PROCEDURE — 36415 COLL VENOUS BLD VENIPUNCTURE: CPT

## 2019-07-25 RX ORDER — TAMSULOSIN HYDROCHLORIDE 0.4 MG/1
1 CAPSULE ORAL
Qty: 0 | Refills: 0 | DISCHARGE

## 2019-07-25 RX ORDER — PANTOPRAZOLE SODIUM 20 MG/1
1 TABLET, DELAYED RELEASE ORAL
Qty: 30 | Refills: 0
Start: 2019-07-25 | End: 2019-08-23

## 2019-07-25 RX ORDER — BUDESONIDE, MICRONIZED 100 %
2 POWDER (GRAM) MISCELLANEOUS
Qty: 720 | Refills: 0
Start: 2019-07-25 | End: 2019-08-23

## 2019-07-25 RX ORDER — MAGNESIUM HYDROXIDE 400 MG/1
30 TABLET, CHEWABLE ORAL DAILY
Refills: 0 | Status: DISCONTINUED | OUTPATIENT
Start: 2019-07-25 | End: 2019-07-25

## 2019-07-25 RX ORDER — IPRATROPIUM/ALBUTEROL SULFATE 18-103MCG
3 AEROSOL WITH ADAPTER (GRAM) INHALATION
Qty: 540 | Refills: 0
Start: 2019-07-25 | End: 2019-08-23

## 2019-07-25 RX ORDER — ATORVASTATIN CALCIUM 80 MG/1
1 TABLET, FILM COATED ORAL
Qty: 30 | Refills: 0
Start: 2019-07-25 | End: 2019-08-23

## 2019-07-25 RX ORDER — DOCUSATE SODIUM 100 MG
1 CAPSULE ORAL
Qty: 90 | Refills: 0
Start: 2019-07-25 | End: 2019-08-23

## 2019-07-25 RX ORDER — METFORMIN HYDROCHLORIDE 850 MG/1
1 TABLET ORAL
Qty: 60 | Refills: 0
Start: 2019-07-25 | End: 2019-08-23

## 2019-07-25 RX ORDER — ACETAMINOPHEN 500 MG
2 TABLET ORAL
Qty: 240 | Refills: 0
Start: 2019-07-25 | End: 2019-08-23

## 2019-07-25 RX ORDER — MAGNESIUM OXIDE 400 MG ORAL TABLET 241.3 MG
400 TABLET ORAL ONCE
Refills: 0 | Status: COMPLETED | OUTPATIENT
Start: 2019-07-25 | End: 2019-07-25

## 2019-07-25 RX ORDER — NICOTINE POLACRILEX 2 MG
1 GUM BUCCAL
Qty: 1 | Refills: 0
Start: 2019-07-25 | End: 2019-08-23

## 2019-07-25 RX ORDER — KETOROLAC TROMETHAMINE 30 MG/ML
15 SYRINGE (ML) INJECTION ONCE
Refills: 0 | Status: DISCONTINUED | OUTPATIENT
Start: 2019-07-25 | End: 2019-07-25

## 2019-07-25 RX ORDER — ACETAMINOPHEN 500 MG
650 TABLET ORAL ONCE
Refills: 0 | Status: COMPLETED | OUTPATIENT
Start: 2019-07-25 | End: 2019-07-25

## 2019-07-25 RX ORDER — ATORVASTATIN CALCIUM 80 MG/1
1 TABLET, FILM COATED ORAL
Qty: 0 | Refills: 0 | DISCHARGE

## 2019-07-25 RX ORDER — TAMSULOSIN HYDROCHLORIDE 0.4 MG/1
1 CAPSULE ORAL
Qty: 30 | Refills: 0
Start: 2019-07-25 | End: 2019-08-23

## 2019-07-25 RX ADMIN — LIDOCAINE 1 PATCH: 4 CREAM TOPICAL at 11:55

## 2019-07-25 RX ADMIN — Medication 100 MILLIGRAM(S): at 06:09

## 2019-07-25 RX ADMIN — Medication 650 MILLIGRAM(S): at 01:30

## 2019-07-25 RX ADMIN — MAGNESIUM HYDROXIDE 30 MILLILITER(S): 400 TABLET, CHEWABLE ORAL at 09:26

## 2019-07-25 RX ADMIN — Medication 650 MILLIGRAM(S): at 02:30

## 2019-07-25 RX ADMIN — MAGNESIUM OXIDE 400 MG ORAL TABLET 400 MILLIGRAM(S): 241.3 TABLET ORAL at 08:48

## 2019-07-25 RX ADMIN — Medication 10 MILLIGRAM(S): at 09:05

## 2019-07-25 RX ADMIN — Medication 1 PATCH: at 11:54

## 2019-07-25 RX ADMIN — Medication 1 PATCH: at 06:11

## 2019-07-25 NOTE — PROGRESS NOTE ADULT - ASSESSMENT
-Please follow up with Dr. Irizarry on 8/1/2019at 10:30AM.  The office is located at [facility], [facility], 4th floor. Call us with any questions #[phone].  - Please follow up with your pulmonologist Dr. Eastman on 8/7/2019 at 11:20AM  - Please follow up with your endocrinologist Dr. Walter. The office will call you with the appointment. If you do not receive a call with your appointment in the next two days please call the number provided to schedule an appointment with Dr. Walter in the next 1-2 weeks.   -Walk daily as tolerated and use your incentive spirometer every hour.  -No driving or strenuous activity/exercise until cleared by your surgeon.  -Gently clean your incisions with anti-bacterial soap and water, pat dry.  You may leave them open to air.  -Call your doctor if you have shortness of breath, chest pain not relieved by pain medication, dizziness, fever >101.5, or increased redness or drainage from incisions.   - You have a dressing at the site of your former chest tube. Please keep this dressing in place, it will be removed at your follow up appointment with Dr. Irizarry.   - You are being discharged with nicotine patches. You will receive a months supply. Please follow up with you doctor to complete the treatement.	  No heavy lifting/straining, Showering allowed
A/P: 68 year old male, current smoker, with no significant past medical history found to have LLL nodule on lung cancer screening surveillance CT. Evaluated by Dr. Irizarry as an outpatient and presented to [facility] today for elective surgery. Patient underwent uncomplicated L VATS LLLx with MLND. While arriving to the PACU patient became agitated and pulled out arterial line and IV. Given IM ketamine by anesthesia with improvement, seen in PACU restrained and doing well.     Neurovascular: Stable.  - Agitation in PACU, currently restrained. Will allow patient to fully wake up from anesthesia and reassess. Continue to monitor closely     Cardiovascular: Hemodynamically stable. HR controlled.  - Hx of LBBB, stable on post op EKG  - HLD, continue atorvastatin 20mg qhs     Respiratory: 02 Sat = 94% on 3L NC   - Continue to wean O2 to maintain SaO2 > 93%   - CXR post operatively with L lung atelectasis, follow up CXR once patient is more awake   - Current smoker, continue nicotine patch   - Encourage C+DB and Use of IS 10x / hr while awake.    GI: Stable.  - Advance diet as tolerated   - Protonix for GI ppx   - Bowel regimen     Renal / :  - Continue flomax 0.4mg for BPH   - Monitor renal function.  - Monitor I/O's.    Endocrine:  hgba1c and TSH pending     Prophylaxis:  - DVT prophylaxis with 5000 SubQ Heparin q8h.  - SCD's    Disposition: Admit to 9L post PACU
68 year old male, current smoker, with no significant past medical history found to have LLL nodule on lung cancer screening surveillance CT. Evaluated by Dr. Irizarry as an outpatient and presented to [facility] today for elective surgery. Patient underwent uncomplicated L VATS LLLx with MLND. While arriving to the PACU patient became agitated and pulled out arterial line and IV. Given IM ketamine by anesthesia with improvement. Today is POD1, chest tube without air leak, now removed, with follow up CXR without evidence of pneumothorax.     Neurovascular: Stable.  - No agitation, mentating well. Continue to monitor closely     Cardiovascular: Hemodynamically stable. HR controlled.  - Hx of LBBB, stable on post op EKG  - HLD, continue atorvastatin 20mg qhs     Respiratory: 02 Sat = 93% on  RA  - Continue to wean O2 to maintain SaO2 > 93%   - Current smoker, continue nicotine patch   - Encourage C+DB and Use of IS 10x / hr while awake.  - Chest tube removed this AM at bedside, patient tolerating procedure well, follow up CXR without evidence of ptx.  - Repeat CXR in AM  - Dr. Eastman, Pulmonologist outpatient, contacted per Dr. Irizarry, started on Duoneb and Budesonide 0.5 BID for COPD, per Dr. Eastman -- please discharge patient home with above.     GI: Stable.  - Tolerating PO diet.   - Protonix for GI ppx   - Bowel regimen     Renal / : BUN/Cr: 20/0.83  - Continue flomax 0.4mg for BPH   - Monitor renal function.  - Monitor I/O's.    Endocrine:  hgba1c and TSH pending     Prophylaxis:  - DVT prophylaxis with 5000 SubQ Heparin q8h.  - SCD's    Disposition: Home tomorrow with smoking cessation education.

## 2019-07-25 NOTE — DISCHARGE NOTE PROVIDER - CARE PROVIDERS DIRECT ADDRESSES
[email or web address]

## 2019-07-25 NOTE — DISCHARGE NOTE NURSING/CASE MANAGEMENT/SOCIAL WORK - NSDCPEWEB_GEN_ALL_CORE
[facility] for Tobacco Control website --- [email or web address] website --- [email or web address]

## 2019-07-25 NOTE — DISCHARGE NOTE PROVIDER - NSDCCPTREATMENT_GEN_ALL_CORE_FT
PRINCIPAL PROCEDURE  Procedure: VATS, with lobectomy  Findings and Treatment: Left VATS, robotic assisted, left lower lobe wedge, left lower lobectomy, mediastinal lymph node dissection

## 2019-07-25 NOTE — PROGRESS NOTE ADULT - SUBJECTIVE AND OBJECTIVE BOX
Patient discussed on morning rounds with Dr. flores    Operation / Date: 7/23/19 L VATS, RA, LLx and MLND    Surgeon: mark    Referring Physician:    SUBJECTIVE ASSESSMENT AND HOSPITAL COURSE:  Pt seen and examined at the bedside. He reports feeling well this morning and is eager to go home. Denies having any pain at this time. States he moved hi bowels this morning and has had a good appetite. Denies headache, dizziness, SOB, AMIN, N/V/D/C, abd pain, calf pain, leg swelling      Vital Signs Last 24 Hrs  T(C): 36.8 (25 Jul 2019 09:18), Max: 37.7 (24 Jul 2019 22:33)  T(F): 98.2 (25 Jul 2019 09:18), Max: 99.8 (24 Jul 2019 22:33)  HR: 90 (25 Jul 2019 08:44) (78 - 92)  BP: 142/95 (25 Jul 2019 08:44) (120/68 - 146/101)  BP(mean): 113 (25 Jul 2019 08:44) (88 - 118)  RR: 20 (25 Jul 2019 08:44) (20 - 25)  SpO2: 93% (25 Jul 2019 08:44) (93% - 95%)    EPICARDIAL WIRES REMOVED: n/a  TIE DOWNS REMOVED: No. to be removed in office    PHYSICAL EXAM:    General: Sitting in chair, NAD    Neurological: Alert and oriented, no focal deficits    Cardiovascular: RRR, normal s1 s2, no M/R/G    Respiratory: Non-labored breathing, chest expansion symmetric, CTA b/l, no W/R/R    Gastrointestinal: +BS x4 quadrant, soft, non-tender to palpation, non-distended    Extremities: WWP, no pitting edema, no calf tenderness or erythema    Vascular: 2+radial pulses b/l, 2+DP pulses b/l    Incision: Left sided VATS incisions CD&I, no discharge, no evidence of dehiscence,     LABS:                        15.3   15.03 )-----------( 230      ( 25 Jul 2019 06:23 )             48.3       COUMADIN:  no    PT/INR - ( 23 Jul 2019 17:52 )   PT: 12.2 sec;   INR: 1.08          PTT - ( 23 Jul 2019 17:52 )  PTT:25.4 sec    07-25    138  |  104  |  16  ----------------------------<  165<H>  4.3   |  24  |  0.78    Ca    9.7      25 Jul 2019 06:23  Mg     1.9     07-25            Discharge CXR:  < from: Xray Chest 1 View- PORTABLE-Routine (07.25.19 @ 06:15) >    Lung parenchyma/Pleura: No focal parenchymal opacities, pleural effusion   or pneumothorax are present.    < end of copied text >    Discharge ECHO:  n/a

## 2019-07-25 NOTE — DISCHARGE NOTE PROVIDER - HOSPITAL COURSE
68 year old male, current smoker, with no significant past medical history found to have LLL nodule on lung cancer screening surveillance CT. Evaluated by Dr. Irizarry as an outpatient and presented to [facility] for elective surgery. On 7/23/2019, Patient underwent uncomplicated L VATS LLLx with MLND. POD1, the chest tube was with decreased drainage and no air leak and was removed at the bedside, follow up CXR stable without evidence of pneumothorax.  Endocrinology was consulted for management of diabetes.  Per Dr. Walter pt will go home on metformin 500mg BID and follow up as an outpatient.  Dr. Eastman, pulmonology, has followed the patient during his hospital stay. Today on POD 2 he is oxygenating well on room air, pain is well controlled, he is moving his bowels and tolerating PO. Per Dr. Irizarry he is medically stable for discharge today.        35 minutes was spent with the patient reviewing the discharge material including medications, follow up appointments, recovery, concerning symptoms, and how to contact their health care providers if they have questions

## 2019-07-25 NOTE — DISCHARGE NOTE NURSING/CASE MANAGEMENT/SOCIAL WORK - NSDCPEEMAIL_GEN_ALL_CORE
[facility] for Tobacco Control email [email or web address]

## 2019-07-25 NOTE — DISCHARGE NOTE PROVIDER - PROVIDER TOKENS
PROVIDER:[TOKEN:[[id number]]],PROVIDER:[TOKEN:[[id number]]],PROVIDER:[TOKEN:[[id number]]]

## 2019-07-25 NOTE — DISCHARGE NOTE NURSING/CASE MANAGEMENT/SOCIAL WORK - NSDCFUADDAPPT_GEN_ALL_CORE_FT
-Please follow up with Dr. Irizarry on 8/1/2019at 10:30AM.  The office is located at [facility], [facility], 4th floor. Call us with any questions  #[phone].    - Please follow up with your pulmonologist Dr. Eastman on 8/7/2019 at 11:20AM    - Please follow up with your endocrinologist Dr. Walter. The office will call you with the appointment. If you do not receive a call with your appointment in the next two days please call the number provided to schedule an appointment with Dr. Walter in the next 1-2 weeks.     -Walk daily as tolerated and use your incentive spirometer every hour.    -No driving or strenuous activity/exercise until  cleared by your surgeon.    -Gently clean your incisions with anti-bacterial soap and water, pat  dry.  You may leave them open to air.    -Call your doctor if you have shortness of breath, chest pain not  relieved by pain medication, dizziness, fever >101.5, or increased  redness or drainage from incisions.     - You have a dressing at the site of your former chest tube. Please keep this dressing in place, it will be removed at your follow up appointment with Dr. Irizarry.     - You are being discharged with nicotine patches. You will receive a months supply. Please follow up with you doctor to complete the treatement.

## 2019-07-25 NOTE — DISCHARGE NOTE PROVIDER - CARE PROVIDER_API CALL
Grant Irizarry (MD)  Thoracic Surgery  [address] [address]  Phone: [phone]  Fax: [phone]  Follow Up Time:     Crow Eastman (MD)  Critical Care Medicine; Internal Medicine; Pulmonary Disease; Sleep Medicine  [address] [id number]  Phone: [phone]  Fax: [phone]  Follow Up Time:     Cathie Walter; PhD)  Internal Medicine  [address], [address]  [place] [address]  Phone: [phone]  Fax: [phone]  Follow Up Time:

## 2019-07-25 NOTE — DISCHARGE NOTE NURSING/CASE MANAGEMENT/SOCIAL WORK - NSDCDPATPORTLINK_GEN_ALL_CORE
You can access the [facility] Patient Portal, offered by [facility], by registering with the following website: [email or web address]

## 2019-07-25 NOTE — DISCHARGE NOTE PROVIDER - NSDCFUADDAPPT_GEN_ALL_CORE_FT
-Please follow up with Dr. Irizarry on 8/1/2019at 10:30AM.  The office is located at [facility], [facility], 4th floor. Call us with any questions  #[phone].    - Please follow up with your pulmonologist Dr. Eastman on 8/7/2019 at 11:20AM    - Please follow up with your endocrinologist Dr. Walter. The office will call you with the appointment. If you do not receive a call with your appointment in the next two days please call the number provided to schedule an appointment with Dr. Walter in the next 1-2 weeks.     -Walk daily as tolerated and use your incentive spirometer every hour.    -No driving or strenuous activity/exercise until  cleared by your surgeon.    -Gently clean your incisions with anti-bacterial soap and water, pat  dry.  You may leave them open to air.    -Call your doctor if you have shortness of breath, chest pain not  relieved by pain medication, dizziness, fever >101.5, or increased  redness or drainage from incisions.     - You have a dressing at the site of your former chest tube. Please keep this dressing in place, it will be removed at your follow up appointment with Dr. Irizarry.     - You are being discharged with nicotine patches. You will receive a months supply. Please follow up with you doctor to complete the treatement.

## 2019-07-29 DIAGNOSIS — F17.210 NICOTINE DEPENDENCE, CIGARETTES, UNCOMPLICATED: ICD-10-CM

## 2019-07-29 DIAGNOSIS — J44.9 CHRONIC OBSTRUCTIVE PULMONARY DISEASE, UNSPECIFIED: ICD-10-CM

## 2019-07-29 DIAGNOSIS — Z98.890 OTHER SPECIFIED POSTPROCEDURAL STATES: ICD-10-CM

## 2019-07-29 RX ORDER — NICOTINE 21-14-7MG
21-14-7 KIT TRANSDERMAL
Qty: 30 | Refills: 0 | Status: ACTIVE | COMMUNITY
Start: 2019-07-29 | End: 1900-01-01

## 2019-07-31 DIAGNOSIS — K76.0 FATTY (CHANGE OF) LIVER, NOT ELSEWHERE CLASSIFIED: ICD-10-CM

## 2019-07-31 DIAGNOSIS — J44.9 CHRONIC OBSTRUCTIVE PULMONARY DISEASE, UNSPECIFIED: ICD-10-CM

## 2019-07-31 DIAGNOSIS — N40.0 BENIGN PROSTATIC HYPERPLASIA WITHOUT LOWER URINARY TRACT SYMPTOMS: ICD-10-CM

## 2019-07-31 DIAGNOSIS — F17.210 NICOTINE DEPENDENCE, CIGARETTES, UNCOMPLICATED: ICD-10-CM

## 2019-07-31 DIAGNOSIS — R91.1 SOLITARY PULMONARY NODULE: ICD-10-CM

## 2019-07-31 DIAGNOSIS — E09.65 DRUG OR CHEMICAL INDUCED DIABETES MELLITUS WITH HYPERGLYCEMIA: ICD-10-CM

## 2019-07-31 DIAGNOSIS — E66.01 MORBID (SEVERE) OBESITY DUE TO EXCESS CALORIES: ICD-10-CM

## 2019-07-31 DIAGNOSIS — I25.2 OLD MYOCARDIAL INFARCTION: ICD-10-CM

## 2019-07-31 DIAGNOSIS — K21.9 GASTRO-ESOPHAGEAL REFLUX DISEASE WITHOUT ESOPHAGITIS: ICD-10-CM

## 2019-07-31 DIAGNOSIS — C34.32 MALIGNANT NEOPLASM OF LOWER LOBE, LEFT BRONCHUS OR LUNG: ICD-10-CM

## 2019-07-31 DIAGNOSIS — I10 ESSENTIAL (PRIMARY) HYPERTENSION: ICD-10-CM

## 2019-07-31 DIAGNOSIS — E78.5 HYPERLIPIDEMIA, UNSPECIFIED: ICD-10-CM

## 2019-07-31 LAB — SURGICAL PATHOLOGY STUDY: SIGNIFICANT CHANGE UP

## 2019-08-01 ENCOUNTER — APPOINTMENT (OUTPATIENT)
Dept: THORACIC SURGERY | Facility: CLINIC | Age: 69
End: 2019-08-01
Payer: MEDICAID

## 2019-08-01 VITALS
WEIGHT: 202 LBS | RESPIRATION RATE: 19 BRPM | DIASTOLIC BLOOD PRESSURE: 88 MMHG | OXYGEN SATURATION: 97 % | SYSTOLIC BLOOD PRESSURE: 138 MMHG | BODY MASS INDEX: 30.62 KG/M2 | TEMPERATURE: 96.8 F | HEIGHT: 68 IN | HEART RATE: 70 BPM

## 2019-08-01 DIAGNOSIS — R91.1 SOLITARY PULMONARY NODULE: ICD-10-CM

## 2019-08-01 PROCEDURE — 99024 POSTOP FOLLOW-UP VISIT: CPT

## 2019-08-02 PROBLEM — R91.1 LEFT LOWER LOBE PULMONARY NODULE: Status: ACTIVE | Noted: 2019-07-03

## 2019-08-15 ENCOUNTER — APPOINTMENT (OUTPATIENT)
Dept: THORACIC SURGERY | Facility: CLINIC | Age: 69
End: 2019-08-15
Payer: MEDICAID

## 2019-08-15 VITALS
WEIGHT: 208 LBS | TEMPERATURE: 96.2 F | SYSTOLIC BLOOD PRESSURE: 120 MMHG | OXYGEN SATURATION: 94 % | BODY MASS INDEX: 31.52 KG/M2 | HEART RATE: 87 BPM | DIASTOLIC BLOOD PRESSURE: 81 MMHG | RESPIRATION RATE: 19 BRPM | HEIGHT: 68 IN

## 2019-08-15 DIAGNOSIS — Z09 ENCOUNTER FOR FOLLOW-UP EXAMINATION AFTER COMPLETED TREATMENT FOR CONDITIONS OTHER THAN MALIGNANT NEOPLASM: ICD-10-CM

## 2019-08-15 PROCEDURE — 99024 POSTOP FOLLOW-UP VISIT: CPT

## 2019-08-22 ENCOUNTER — APPOINTMENT (OUTPATIENT)
Dept: THORACIC SURGERY | Facility: CLINIC | Age: 69
End: 2019-08-22

## 2019-11-07 ENCOUNTER — APPOINTMENT (OUTPATIENT)
Dept: THORACIC SURGERY | Facility: CLINIC | Age: 69
End: 2019-11-07
Payer: MEDICARE

## 2019-11-07 PROCEDURE — 99214 OFFICE O/P EST MOD 30 MIN: CPT

## 2019-11-07 NOTE — PHYSICAL EXAM
[] : no respiratory distress [Respiration, Rhythm And Depth] : normal respiratory rhythm and effort [Exaggerated Use Of Accessory Muscles For Inspiration] : no accessory muscle use [Auscultation Breath Sounds / Voice Sounds] : lungs were clear to auscultation bilaterally [Heart Sounds] : normal S1 and S2 [Apical Impulse] : the apical impulse was normal [Examination Of The Chest] : the chest was normal in appearance [2+] : left 2+ [Abnormal Walk] : normal gait [Nail Clubbing] : no clubbing  or cyanosis of the fingernails [Musculoskeletal - Swelling] : no joint swelling seen [Motor Tone] : muscle strength and tone were normal [No Focal Deficits] : no focal deficits [Oriented To Time, Place, And Person] : oriented to person, place, and time

## 2019-11-11 NOTE — ASSESSMENT
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 \par \par Postoperatively, patient is recovering well. Incisions healed, pain controlled. He does admit to mild dyspnea on exertion, but otherwise doing well. He had a recent CT Chest with oncologist, Dr Aiyana Brown revealing mild enlargement of mediastinal/left hilar lymph nodes which may likely reactive. Will have patient follow up with us after PET scheduled with oncologist. \par \par I have reviewed the patient's medical records and diagnostic images at the time of this office consultation and have made the following recommendation.\par Plan:\par 1. RTC 3m after PET with Dr Aiyana Brown.\par 2. Advised patient to quit smoking.

## 2019-11-11 NOTE — HISTORY OF PRESENT ILLNESS
[FreeTextEntry1] : 68 year old male, current smoker (45 pack years), with no significant medical history. Lung cancer screening surveillance CT chest revealed left lower lobe nodule. Follow up PET scan revealed left lower lobe spiculated mass with max SUV of 4.7. He underwent a LEFT VATS, robotic assisted, Left lower lobectomy on 07/23/19 for a Stage 1A2 Squamous cell Carcinoma. He is referred by Dr. Britton. \par \par He presents today for a follow-up visit to discuss PFTs and CT. Overall, he has been doing generally well after surgery. Currently on nicotine patch, but does admit to smoking a few cigarettes once in awhile. Denies pain to incisions, SOB, cough. \par \par PET scan completed on 6/25/19:\par - focal abnormal hypermetabolic activity associated with the spiculated mass medial left lower lobe lung (SUV 4.7)\par - hypermetabolic subcutaneous focus in the right of midline in the lower back new since 11/2017, possible sebaceous cyst \par \par Patient was evaluated by Dr. Espinoza and no adjuvant chemo/RT recommended.\par \par PFT done on 10/21/19 showed FEV1 = 2.70, 81% of predicted value, FVC = 3.14, 69% of predicted value,  DLCO = 25.86, 72% of predicted value.\par \par CT Chest 10/25/19 \par - No pulmonary parenchymal mass\par - Mild enlargement of mediastinal/left hilar lymph nodes, nonspecific. Recurrent neoplasm cannot be entirely excluded. Short term f/u with CT or PET recommended. \par

## 2020-02-06 ENCOUNTER — APPOINTMENT (OUTPATIENT)
Dept: THORACIC SURGERY | Facility: CLINIC | Age: 70
End: 2020-02-06
Payer: MEDICAID

## 2020-02-06 DIAGNOSIS — C34.90 MALIGNANT NEOPLASM OF UNSPECIFIED PART OF UNSPECIFIED BRONCHUS OR LUNG: ICD-10-CM

## 2020-02-06 PROCEDURE — 99213 OFFICE O/P EST LOW 20 MIN: CPT

## 2020-02-06 NOTE — PHYSICAL EXAM
[] : no respiratory distress [Exaggerated Use Of Accessory Muscles For Inspiration] : no accessory muscle use [Respiration, Rhythm And Depth] : normal respiratory rhythm and effort [Auscultation Breath Sounds / Voice Sounds] : lungs were clear to auscultation bilaterally [Apical Impulse] : the apical impulse was normal [Heart Sounds] : normal S1 and S2 [Heart Rate And Rhythm] : heart rate was normal and rhythm regular [Examination Of The Chest] : the chest was normal in appearance [2+] : left 2+ [Abnormal Walk] : normal gait [Oriented To Time, Place, And Person] : oriented to person, place, and time

## 2020-02-10 NOTE — ASSESSMENT
[FreeTextEntry1] : 68 y/o male s/p LEFT VATS, robotic assisted, Left lower lobectomy on 07/23/19 for a Stage 1A2 Squamous cell Carcinoma. He presents today with a PET CT.\par \par Patient admits to feeling well. He is currently not smoking but occasionally smokes electronic cigarettes. From a lung perspective no evidence of residual disease. Will plan for a CT chest in 6 months. \par \par Plan:\par 1. CT chest in 6 months

## 2020-02-10 NOTE — HISTORY OF PRESENT ILLNESS
[FreeTextEntry1] : 69 year old male, current smoker (45 pack years), with no significant medical history. Lung cancer screening surveillance CT chest revealed left lower lobe nodule. Follow up PET scan revealed left lower lobe spiculated mass with max SUV of 4.7. He underwent a LEFT VATS, robotic assisted, Left lower lobectomy on 07/23/19 for a Stage 1A2 Squamous cell Carcinoma. He presents today to review recent PET scan. He is referred by Dr. Britton. \par \par PET scan completed on 6/25/19:\par - focal abnormal hypermetabolic activity associated with the spiculated mass medial left lower lobe lung (SUV 4.7)\par - hypermetabolic subcutaneous focus in the right of midline in the lower back new since 11/2017, possible sebaceous cyst \par \par Patient was evaluated by Dr. Espinoza and no adjuvant chemo/RT recommended.\par \par PFT done on 10/21/19 showed FEV1 = 2.70, 81% of predicted value, FVC = 3.14, 69% of predicted value, DLCO = 25.86, 72% of predicted value.\par \par CT Chest 10/25/19 \par - No pulmonary parenchymal mass\par - Mild enlargement of mediastinal/left hilar lymph nodes, nonspecific. Recurrent neoplasm cannot be entirely excluded. Short term f/u with CT or PET recommended. \par \par PET scan completed on 1/21/2020:\par - new small mildly hypermetabolic left internal jugular lymph node\par - otherwise no abnormal hypermetabolic activity to suggest malignancy at this time\par \par

## 2022-03-30 DIAGNOSIS — Z01.818 ENCOUNTER FOR OTHER PREPROCEDURAL EXAMINATION: ICD-10-CM

## 2022-03-31 ENCOUNTER — APPOINTMENT (OUTPATIENT)
Dept: PULMONOLOGY | Facility: CLINIC | Age: 72
End: 2022-03-31

## 2023-11-21 NOTE — DISCHARGE NOTE PROVIDER - REASON FOR ADMISSION
Lung nodule Debridement Text: The wound edges were debrided prior to proceeding with the closure to facilitate wound healing.